# Patient Record
Sex: FEMALE | Race: WHITE | NOT HISPANIC OR LATINO | Employment: FULL TIME | URBAN - METROPOLITAN AREA
[De-identification: names, ages, dates, MRNs, and addresses within clinical notes are randomized per-mention and may not be internally consistent; named-entity substitution may affect disease eponyms.]

---

## 2023-04-30 ASSESSMENT — ENCOUNTER SYMPTOMS
TROUBLE SWALLOWING: 0
SORE THROAT: 1
NECK PAIN: 0
VOMITING: 0
ABDOMINAL PAIN: 0
DIARRHEA: 0
STRIDOR: 0
COUGH: 0
HEADACHES: 0
SWOLLEN GLANDS: 1
HOARSE VOICE: 0
SHORTNESS OF BREATH: 0

## 2023-05-02 ENCOUNTER — OFFICE VISIT (OUTPATIENT)
Dept: PRIMARY CARE | Facility: CLINIC | Age: 43
End: 2023-05-02
Payer: COMMERCIAL

## 2023-05-02 ENCOUNTER — LAB (OUTPATIENT)
Dept: LAB | Facility: LAB | Age: 43
End: 2023-05-02
Payer: COMMERCIAL

## 2023-05-02 VITALS
HEIGHT: 68 IN | OXYGEN SATURATION: 98 % | SYSTOLIC BLOOD PRESSURE: 116 MMHG | DIASTOLIC BLOOD PRESSURE: 66 MMHG | RESPIRATION RATE: 18 BRPM | HEART RATE: 92 BPM | WEIGHT: 167.2 LBS | BODY MASS INDEX: 25.34 KG/M2

## 2023-05-02 DIAGNOSIS — J02.0 STREP THROAT: ICD-10-CM

## 2023-05-02 DIAGNOSIS — J02.0 STREP THROAT: Primary | ICD-10-CM

## 2023-05-02 PROBLEM — L25.9 CONTACT DERMATITIS: Status: RESOLVED | Noted: 2023-05-02 | Resolved: 2023-05-02

## 2023-05-02 PROBLEM — F41.9 ANXIETY: Status: RESOLVED | Noted: 2023-05-02 | Resolved: 2023-05-02

## 2023-05-02 PROBLEM — G47.00 INSOMNIA: Status: RESOLVED | Noted: 2023-05-02 | Resolved: 2023-05-02

## 2023-05-02 PROBLEM — T14.8XXA WOUND INFECTION: Status: RESOLVED | Noted: 2023-05-02 | Resolved: 2023-05-02

## 2023-05-02 PROBLEM — J01.10 ACUTE FRONTAL SINUSITIS: Status: RESOLVED | Noted: 2023-05-02 | Resolved: 2023-05-02

## 2023-05-02 PROBLEM — L08.9 WOUND INFECTION: Status: RESOLVED | Noted: 2023-05-02 | Resolved: 2023-05-02

## 2023-05-02 PROBLEM — R11.0 NAUSEA: Status: RESOLVED | Noted: 2023-05-02 | Resolved: 2023-05-02

## 2023-05-02 LAB
ALANINE AMINOTRANSFERASE (SGPT) (U/L) IN SER/PLAS: 12 U/L (ref 7–45)
ALBUMIN (G/DL) IN SER/PLAS: 4.2 G/DL (ref 3.4–5)
ALKALINE PHOSPHATASE (U/L) IN SER/PLAS: 35 U/L (ref 33–110)
ANION GAP IN SER/PLAS: 15 MMOL/L (ref 10–20)
ASPARTATE AMINOTRANSFERASE (SGOT) (U/L) IN SER/PLAS: 15 U/L (ref 9–39)
BILIRUBIN TOTAL (MG/DL) IN SER/PLAS: 0.4 MG/DL (ref 0–1.2)
CALCIUM (MG/DL) IN SER/PLAS: 9.4 MG/DL (ref 8.6–10.3)
CARBON DIOXIDE, TOTAL (MMOL/L) IN SER/PLAS: 26 MMOL/L (ref 21–32)
CHLORIDE (MMOL/L) IN SER/PLAS: 101 MMOL/L (ref 98–107)
CREATININE (MG/DL) IN SER/PLAS: 0.73 MG/DL (ref 0.5–1.05)
ERYTHROCYTE DISTRIBUTION WIDTH (RATIO) BY AUTOMATED COUNT: 12.1 % (ref 11.5–14.5)
ERYTHROCYTE MEAN CORPUSCULAR HEMOGLOBIN CONCENTRATION (G/DL) BY AUTOMATED: 32.8 G/DL (ref 32–36)
ERYTHROCYTE MEAN CORPUSCULAR VOLUME (FL) BY AUTOMATED COUNT: 90 FL (ref 80–100)
ERYTHROCYTES (10*6/UL) IN BLOOD BY AUTOMATED COUNT: 4.47 X10E12/L (ref 4–5.2)
GFR FEMALE: >90 ML/MIN/1.73M2
GLUCOSE (MG/DL) IN SER/PLAS: 88 MG/DL (ref 74–99)
HEMATOCRIT (%) IN BLOOD BY AUTOMATED COUNT: 40.3 % (ref 36–46)
HEMOGLOBIN (G/DL) IN BLOOD: 13.2 G/DL (ref 12–16)
LEUKOCYTES (10*3/UL) IN BLOOD BY AUTOMATED COUNT: 8.4 X10E9/L (ref 4.4–11.3)
PLATELETS (10*3/UL) IN BLOOD AUTOMATED COUNT: 337 X10E9/L (ref 150–450)
POTASSIUM (MMOL/L) IN SER/PLAS: 4.1 MMOL/L (ref 3.5–5.3)
PROTEIN TOTAL: 6.9 G/DL (ref 6.4–8.2)
SODIUM (MMOL/L) IN SER/PLAS: 138 MMOL/L (ref 136–145)
UREA NITROGEN (MG/DL) IN SER/PLAS: 10 MG/DL (ref 6–23)

## 2023-05-02 PROCEDURE — 86664 EPSTEIN-BARR NUCLEAR ANTIGEN: CPT

## 2023-05-02 PROCEDURE — 86663 EPSTEIN-BARR ANTIBODY: CPT

## 2023-05-02 PROCEDURE — 99203 OFFICE O/P NEW LOW 30 MIN: CPT | Performed by: NURSE PRACTITIONER

## 2023-05-02 PROCEDURE — 80053 COMPREHEN METABOLIC PANEL: CPT

## 2023-05-02 PROCEDURE — 1036F TOBACCO NON-USER: CPT | Performed by: NURSE PRACTITIONER

## 2023-05-02 PROCEDURE — 86665 EPSTEIN-BARR CAPSID VCA: CPT

## 2023-05-02 PROCEDURE — 36415 COLL VENOUS BLD VENIPUNCTURE: CPT

## 2023-05-02 PROCEDURE — 85027 COMPLETE CBC AUTOMATED: CPT

## 2023-05-02 RX ORDER — METHYLPREDNISOLONE 4 MG/1
TABLET ORAL
Qty: 21 TABLET | Refills: 0 | Status: SHIPPED | OUTPATIENT
Start: 2023-05-02 | End: 2023-05-09

## 2023-05-02 RX ORDER — LEVONORGESTREL/ETHINYL ESTRADIOL AND ETHINYL ESTRADIOL 100-20(84)
1 KIT ORAL DAILY
COMMUNITY
Start: 2023-02-11

## 2023-05-02 RX ORDER — IBUPROFEN 200 MG
200 TABLET ORAL EVERY 6 HOURS PRN
COMMUNITY

## 2023-05-02 ASSESSMENT — ENCOUNTER SYMPTOMS
TROUBLE SWALLOWING: 0
SHORTNESS OF BREATH: 0
VOMITING: 0
COUGH: 0
SWOLLEN GLANDS: 1
HEADACHES: 0
SORE THROAT: 1
ABDOMINAL PAIN: 0
STRIDOR: 0
DIARRHEA: 0
HOARSE VOICE: 0
NECK PAIN: 0

## 2023-05-02 NOTE — PROGRESS NOTES
Answers submitted by the patient for this visit:  Sore Throat Questionnaire (Submitted on 4/30/2023)  Chief Complaint: Sore throat  Chronicity: recurrent  Onset: 1 to 4 weeks ago  Progression since onset: gradually improving  Pain worse on: left  Fever: no fever  Pain - numeric: 1/10  abdominal pain: No  congestion: No  cough: No  diarrhea: No  drooling: No  ear discharge: No  ear pain: Yes  headaches: No  hoarse voice: No  neck pain: No  plugged ear sensation: No  shortness of breath: No  stridor: No  swollen glands: Yes  trouble swallowing: No  vomiting: No  strep: Yes  mono: No    Subjective   Patient ID: Ligia Haile is a 42 y.o. female who presents for URI (NP.OV. here today for sick visit. She has concerns of possible strep throat. States that she had strep in March completed two rounds of antibiotics (last dose yesterday), but still with left swollen tonsil 1672).    Was diagnosed with strep throat in March.  Had PCN but did not resolve so returned and was given Augmentin. She completed course of augmentin yesterday  She was seen in    She had rapid test for mono and was negative  She is a  at [employer]  She has been fatigued during this time as well    She has one daughter age 13  She does not smoke    Has never had mono     Has not had fever, cough or sinus drainage    Sore Throat   This is a recurrent problem. The current episode started 1 to 4 weeks ago. The problem has been gradually improving. The pain is worse on the left side. There has been no fever. The pain is at a severity of 1/10. Associated symptoms include ear pain and swollen glands. Pertinent negatives include no abdominal pain, congestion, coughing, diarrhea, drooling, ear discharge, headaches, hoarse voice, plugged ear sensation, neck pain, shortness of breath, stridor, trouble swallowing or vomiting. She has had exposure to strep. She has had no exposure to mono.        Review of Systems   HENT:  Positive for ear pain  "and sore throat. Negative for congestion, drooling, ear discharge, hoarse voice and trouble swallowing.    Respiratory:  Negative for cough, shortness of breath and stridor.    Gastrointestinal:  Negative for abdominal pain, diarrhea and vomiting.   Musculoskeletal:  Negative for neck pain.   Neurological:  Negative for headaches.   All other systems reviewed and are negative.      Objective   /66   Pulse 92   Resp 18   Ht 1.727 m (5' 8\")   Wt 75.8 kg (167 lb 3.2 oz)   SpO2 98%   BMI 25.42 kg/m²     Physical Exam  Vitals and nursing note reviewed.   Constitutional:       Appearance: Normal appearance.   HENT:      Head: Normocephalic and atraumatic.      Right Ear: Tympanic membrane, ear canal and external ear normal.      Left Ear: Tympanic membrane, ear canal and external ear normal.      Nose: Nose normal.      Mouth/Throat:      Pharynx: Posterior oropharyngeal erythema present. No oropharyngeal exudate.      Tonsils: No tonsillar exudate or tonsillar abscesses. 1+ on the right. 1+ on the left.      Comments: Tonsil stones - worse to left tonsil  Neck:      Vascular: No carotid bruit.   Cardiovascular:      Rate and Rhythm: Normal rate and regular rhythm.      Pulses: Normal pulses.      Heart sounds: Normal heart sounds.   Pulmonary:      Effort: Pulmonary effort is normal.      Breath sounds: Normal breath sounds.   Abdominal:      General: Abdomen is flat. Bowel sounds are normal.      Palpations: Abdomen is soft.   Musculoskeletal:      Cervical back: Normal range of motion.   Lymphadenopathy:      Cervical: No cervical adenopathy.   Skin:     General: Skin is warm and dry.      Capillary Refill: Capillary refill takes less than 2 seconds.   Neurological:      General: No focal deficit present.      Mental Status: She is alert and oriented to person, place, and time.   Psychiatric:         Mood and Affect: Mood normal.         Behavior: Behavior normal.         Thought Content: Thought content " normal.         Judgment: Judgment normal.         Assessment/Plan   Problem List Items Addressed This Visit          Infectious/Inflammatory    Strep throat - Primary     Completed 2 courses of antibiotics.  Completed most recent round yesterday  Tonsil stone noted - worse to left - discussed findings   Only had mono spot at   EBV panel - never has had mono  Cbc,cmp  May take tylenol for discomfort          Relevant Medications    methylPREDNISolone (Medrol Dospak) 4 mg tablets    Other Relevant Orders    Tamara-Barr Virus Antibody Panel    CBC    Comprehensive Metabolic Panel

## 2023-05-02 NOTE — PATIENT INSTRUCTIONS
The white area that you see are tonsil stones - these are ok  Your tonsils are red - so take the steroid to help with the swelling  Have your blood test done to see if you have any stages of mono

## 2023-05-02 NOTE — ASSESSMENT & PLAN NOTE
Completed 2 courses of antibiotics.  Completed most recent round yesterday  Tonsil stone noted - worse to left - discussed findings   Only had mono spot at   EBV panel - never has had mono  Cbc,cmp  Medrol dose pack   May take tylenol for discomfort

## 2023-05-03 LAB
EBV INTERPRETATION: ABNORMAL
EPSTEIN-BARR VCA IGG: POSITIVE
EPSTEIN-BARR VCA IGM: NEGATIVE
EPSTEIN-BARR VIRUS EARLY ANTIGEN ANTIBODY, IGG: NEGATIVE
EPSTIEN-BARR NUCLEAR ANTIGEN AB: POSITIVE

## 2023-08-10 ENCOUNTER — HOSPITAL ENCOUNTER (OUTPATIENT)
Dept: DATA CONVERSION | Facility: HOSPITAL | Age: 43
Discharge: HOME | End: 2023-08-10

## 2023-08-10 DIAGNOSIS — Z11.51 ENCOUNTER FOR SCREENING FOR HUMAN PAPILLOMAVIRUS (HPV): ICD-10-CM

## 2023-08-10 DIAGNOSIS — Z01.419 ENCOUNTER FOR GYNECOLOGICAL EXAMINATION (GENERAL) (ROUTINE) WITHOUT ABNORMAL FINDINGS: ICD-10-CM

## 2023-09-16 VITALS
DIASTOLIC BLOOD PRESSURE: 74 MMHG | WEIGHT: 169 LBS | SYSTOLIC BLOOD PRESSURE: 122 MMHG | BODY MASS INDEX: 25.03 KG/M2 | HEIGHT: 69 IN

## 2024-07-22 ASSESSMENT — PROMIS GLOBAL HEALTH SCALE
RATE_SOCIAL_SATISFACTION: EXCELLENT
RATE_MENTAL_HEALTH: GOOD
CARRYOUT_SOCIAL_ACTIVITIES: EXCELLENT
RATE_AVERAGE_FATIGUE: MILD
RATE_QUALITY_OF_LIFE: VERY GOOD
RATE_GENERAL_HEALTH: EXCELLENT
EMOTIONAL_PROBLEMS: SOMETIMES
CARRYOUT_PHYSICAL_ACTIVITIES: COMPLETELY
RATE_AVERAGE_PAIN: 1
RATE_PHYSICAL_HEALTH: VERY GOOD

## 2024-07-22 NOTE — PROGRESS NOTES
"Subjective   Reason for Visit: Ligia Lynch is an 43 y.o. female here for a CPE     Chief Complaint   Patient presents with    Annual Exam     Ligia Lynhc is a 43 y.o. female is here today for a CPE. Patient reports no questions or concerns at this time.          HPI  Ligia is a former pt of [facility] presenting today for Annual CPE  LOV: May 2023    Pt reports currently being treated with Prednisone for poison ivy outbreak to B/L forearms and face    Pt c/o \"crunching sound\" to LEFT knee when bends  No pain  No injury/trauma  Will try OTC Tumeric and f/u if needed       Health Maintenance Due   Topic Date Due    Lipid Panel  Never done    Colorectal Cancer Screening  Never done    Hepatitis B Vaccines (1 of 3 - 19+ 3-dose series) Never done    MMR Vaccines (1 of 1 - Standard series) Never done    COVID-19 Vaccine (3 - 2023-24 season) 09/01/2023    Mammogram  11/29/2023       Occupation: FT at [employer]     Do you take any herbs or supplements that were not prescribed by a doctor? MVI, Vitamin C, D, fish oil     Colon Cancer Screening: Due     LMP: OCP   PAP: 2023  Mammogram:  Due   GYN: Dr. Pascal     Fasting blood work: Due   Last eye exam: 2023  Last dental Exam: 2024  Exercise: Intermittently  Mood: ups and downs   Sleep: no concerns   Vaccines: UTD      No Known Allergies            No visits with results within 60 Day(s) from this visit.   Latest known visit with results is:   [facility] Encounter on 08/10/2023   Component Date Value Ref Range Status    CONVERTED FINAL DIAGNOSIS 08/10/2023    Final                    Value:  SPECIMEN ADEQUACY:       Satisfactory for evaluation.           Endocervical transformation zone component absent.         GENERAL CATEGORIZATION:       Negative for intraepithelial lesion or malignancy.            See interpretation-result.              INTERPRETATION/RESULT:      Negative for intraepithelial lesion or malignancy.         The above diagnosis was rendered at [facility] [facility],  [address] [address], CLIA number [id number].   This information is included on the report as part of a CLIA  requirement.             NINA Wisdom(ASCP)  08/17/2023 09:05  [facility] - 08/17/2023  Screened By:Rafi Mojica M.D.      CONVERTED COMMENT 08/10/2023    Final                    Value:The Pap test is only a screening test for cervical cancer. As with all  screening tests, false-negative results can occur, emphasizing the need  for ongoing surveillance and clinical correlation. If there are any  questions about the results of this screening test, please call the  pathology laboratory.      CONVERTED CLINICAL CYTOLOGY HISTORY 08/10/2023    Final                    Value:CLINICAL HISTORY:  Date of Last Menstrual Period: UNSURE OCP  Automatic HPV at Clinicians' Request      CONVERTED INTERPRETATION 08/10/2023    Final                    Value:  HPV High Risk Panel & Genotyping:    Analyte                       Analysis         HPV 16 (High Risk)               NEGATIVE    HPV 18 (High Risk)               NEGATIVE    *Other HPV (High Risk)          NEGATIVE    *Other high risk types include 31, 33, 35, 39, 45, 51, 52, 56, 58, 59,  66, and 68.    Expected value Negative for all analytes.    Date:     8/14/2023         Disclaimer:  Negative Result does not preclude the presence of HPV infection because  results are dependent on adequate specimen collection, absence of  inhibitors and sufficient DNA to be detected.    The Roche Natalie HPV Assay is a FDA cleared diagnostic test.   [facility] is authorized under CLIA to perform high complexity  testing.    NINA Wisdom(ASCP)  08/17/2023 09:06           Patient Care Team:  KUNAL Pruett as PCP - General (Family Medicine)  KUNAL Ahumada as PCP - MMO [facility] PCP     Review of Systems  ROS was completed and all systems are negative with the exception of what was noted in the the  "HPI.       Objective   Vitals:  /78 (BP Location: Right arm, Patient Position: Sitting)   Pulse 82   Ht 1.753 m (5' 9\")   Wt 73.7 kg (162 lb 6.4 oz)   SpO2 97%   BMI 23.98 kg/m²       Current Outpatient Medications   Medication Instructions    Camrese Lo 0.1 mg-20 mcg (84)/10 mcg (7) tablets,dose pack,3 month 1 tablet, oral, Daily    predniSONE (DELTASONE) 10 mg, oral, Daily, Tapered dose pack       Physical Exam  Vitals reviewed.   HENT:      Right Ear: Tympanic membrane normal.      Left Ear: Tympanic membrane normal.      Mouth/Throat:      Mouth: Mucous membranes are moist.   Eyes:      Conjunctiva/sclera: Conjunctivae normal.   Cardiovascular:      Pulses: Normal pulses.      Heart sounds: Normal heart sounds.   Pulmonary:      Effort: Pulmonary effort is normal.      Breath sounds: Normal breath sounds.   Abdominal:      General: Bowel sounds are normal.   Musculoskeletal:         General: Normal range of motion.   Skin:     General: Skin is warm and dry.   Neurological:      Mental Status: She is alert and oriented to person, place, and time.   Psychiatric:         Mood and Affect: Mood normal.         Assessment & Plan  Annual physical exam  - Counseled on healthy diet and regular exercise  - Fall avoidance information provided  - Personalized prevention plan provided   - Mammogram ordered  - Colon ordered  - PAP UTD  - Vaccines UTD       Orders:    Basic Metabolic Panel; Future    Lipid Panel; Future    Vitamin D 25-Hydroxy,Total (for eval of Vitamin D levels); Future    Colon cancer screening  Pt requests Cologuard    Orders:    Cologuard® colon cancer screening; Future    Cologuard® colon cancer screening    Breast screening  Schedule Mammogram     Orders:    BI mammo bilateral screening tomosynthesis; Future           "

## 2024-07-23 ENCOUNTER — APPOINTMENT (OUTPATIENT)
Dept: PRIMARY CARE | Facility: CLINIC | Age: 44
End: 2024-07-23

## 2024-07-23 VITALS
WEIGHT: 162.4 LBS | HEIGHT: 69 IN | BODY MASS INDEX: 24.05 KG/M2 | OXYGEN SATURATION: 97 % | DIASTOLIC BLOOD PRESSURE: 78 MMHG | HEART RATE: 82 BPM | SYSTOLIC BLOOD PRESSURE: 124 MMHG

## 2024-07-23 DIAGNOSIS — Z12.11 COLON CANCER SCREENING: ICD-10-CM

## 2024-07-23 DIAGNOSIS — Z00.00 ANNUAL PHYSICAL EXAM: Primary | ICD-10-CM

## 2024-07-23 DIAGNOSIS — Z12.39 BREAST SCREENING: ICD-10-CM

## 2024-07-23 PROCEDURE — 99396 PREV VISIT EST AGE 40-64: CPT | Performed by: NURSE PRACTITIONER

## 2024-07-23 PROCEDURE — 1036F TOBACCO NON-USER: CPT | Performed by: NURSE PRACTITIONER

## 2024-07-23 PROCEDURE — 3008F BODY MASS INDEX DOCD: CPT | Performed by: NURSE PRACTITIONER

## 2024-07-23 RX ORDER — PREDNISONE 10 MG/1
10 TABLET ORAL DAILY
COMMUNITY

## 2024-07-23 NOTE — PATIENT INSTRUCTIONS
Thank you for seeing me today.  It was a pleasure to see you again!    Today we did your Annual Physical Exam and discussed the following:     Continue prednisone for poison ivy    Do Cologuard    Schedule Mammogram    Lab work ordered today.  Please have your blood drawn in the next 1-2 weeks.  You need to be FASTING for 12 hours prior to blood draw.  You may only have water.  Please contact your insurance company to ask about the best location to get blood drawn.  We will contact you with the results of your blood work and any necessary adjustments  to your plan of care, if you do not hear from us within 3-5 days of having your blood drawn, please call the office at [phone].    For assistance with scheduling referrals or consultations, please call [phone] or [phone].    For laboratory, radiology, and other tests, please call [phone] ([phone] for pediatrics).   If you do not get results within 7-10 days, or you have any questions or concerns, please send a message, call the office ([phone]), or return to the office for a follow-up appointment.     For acute/sick visits, if you are unable to get an office visit, you can do a  On Demand Virtual Visit that is accessible via your My Chart account.  For emergencies, call 9-1-1 or go to the nearest Emergency Department.     Please schedule additional appointment(s) to address concern(s) not addressed today.    Please review prescription inserts and published information for possible adverse effects of all medications.     In general, results are discussed over the phone or via  [employer].     You can see your health information, review clinical summaries from office visits & test results online when you follow your health with MY  Chart, a personal health record.   To sign up go to [email or web address].   If you need assistance with signing up or trouble getting into your account call [employer] Patient Line 24/7 at [phone]      RTC ANNUALLY AND AS NEEDED    No

## 2024-07-23 NOTE — ASSESSMENT & PLAN NOTE
- Counseled on healthy diet and regular exercise  - Fall avoidance information provided  - Personalized prevention plan provided   - Mammogram ordered  - Colon ordered  - PAP UTD  - Vaccines UTD       Orders:    Basic Metabolic Panel; Future    Lipid Panel; Future    Vitamin D 25-Hydroxy,Total (for eval of Vitamin D levels); Future

## 2024-08-05 LAB — NONINV COLON CA DNA+OCC BLD SCRN STL QL: NEGATIVE
